# Patient Record
Sex: FEMALE | Race: WHITE | URBAN - METROPOLITAN AREA
[De-identification: names, ages, dates, MRNs, and addresses within clinical notes are randomized per-mention and may not be internally consistent; named-entity substitution may affect disease eponyms.]

---

## 2017-02-27 ENCOUNTER — OFFICE VISIT (OUTPATIENT)
Dept: LAB | Facility: SCHOOL | Age: 27
End: 2017-02-27

## 2017-02-27 VITALS
SYSTOLIC BLOOD PRESSURE: 98 MMHG | HEART RATE: 115 BPM | BODY MASS INDEX: 19.77 KG/M2 | DIASTOLIC BLOOD PRESSURE: 60 MMHG | HEIGHT: 66 IN | OXYGEN SATURATION: 100 % | WEIGHT: 123 LBS

## 2017-02-27 DIAGNOSIS — R00.0 TACHYCARDIA: ICD-10-CM

## 2017-02-27 DIAGNOSIS — R11.0 NAUSEA: ICD-10-CM

## 2017-02-27 DIAGNOSIS — R10.84 ABDOMINAL PAIN, GENERALIZED: Primary | ICD-10-CM

## 2017-02-27 PROCEDURE — 99203 OFFICE O/P NEW LOW 30 MIN: CPT | Performed by: PHYSICIAN ASSISTANT

## 2017-02-27 NOTE — PROGRESS NOTES
"  SUBJECTIVE:                                                    Nandini Washburn is a 26 year old female who presents to clinic today for the following health issues:    For the past 24 hours she has felt fatigued, generally unwell, faster heart beat. Felt hot.  Nauseous today, no vomiting, no fevers. Some constipation with harder stool today.    Hx of irregular heartbeat with cardiac ablation x 2.    No URI sx, no sore throat, no diet changes. No meds. No cough. Some abd cramping. No urinary symptoms.    Hx of LEEP procedure 1 month ago with bleeding. No hx of anemia      Rapid heartbeat      Patient complains of rapid heartbeat off and on for the past 24 hours.  Feeling nausea and very warm.             Problem list and histories reviewed & adjusted, as indicated.  Additional history: as documented    Problem list, Medication list, Allergies, and Medical/Social/Surgical histories reviewed in EPIC and updated as appropriate.    ROS:  Constitutional, HEENT, cardiovascular, pulmonary, gi and gu systems are negative, except as otherwise noted.    OBJECTIVE:                                                    BP 98/60  Pulse 115  Ht 5' 6\" (1.676 m)  Wt 123 lb (55.8 kg)  LMP 02/20/2017  SpO2 100%  BMI 19.85 kg/m2  Body mass index is 19.85 kg/(m^2).  GENERAL: healthy, alert and no distress  EYES: Eyes grossly normal to inspection, PERRL and conjunctivae and sclerae normal  HENT: ear canals and TM's normal, nose and mouth without ulcers or lesions  NECK: no adenopathy, no asymmetry, masses, or scars and thyroid normal to palpation  RESP: lungs clear to auscultation - no rales, rhonchi or wheezes  CV: tachycardic, normal S1 S2, no S3 or S4, no murmur, click or rub, no peripheral edema and peripheral pulses strong  ABDOMEN: soft,  Mild lower tenderness without rebound or guarding, no hepatosplenomegaly, no masses and bowel sounds normal       ASSESSMENT/PLAN:                                                      (R10.84) " Abdominal pain, generalized  (primary encounter diagnosis)  (R11.0) Nausea  (R00.0) Tachycardia    Could be constipation, but would check CBC to r/o anemia, EKG in urgent care given hx of cardiac ablation and her tachycardia. Possible abd xray to confirm constipation. [facility] go to  [facility] now.

## 2017-02-27 NOTE — MR AVS SNAPSHOT
"              After Visit Summary   2017    Nandini Washburn    MRN: [id number]           Patient Information     Date Of Birth          1990        Visit Information        Provider Department      2017 2:00 PM Laurie Joseph PA-C [facility]        Today's Diagnoses     Abdominal pain, generalized    -  1    Nausea        Tachycardia           Follow-ups after your visit        Who to contact     You can reach your care team any time of the day by calling [phone].  Notification of test results:  If you have an abnormal lab result, we will notify you by phone as soon as possible.         Additional Information About Your Visit        MyChart Information     [employer] lets you send messages to your doctor, view your test results, renew your prescriptions, schedule appointments and more. To sign up, go to [email or web address] . Click on \"Log in\" on the left side of the screen, which will take you to the Welcome page. Then click on \"Sign up Now\" on the right side of the page.     You will be asked to enter the access code listed below, as well as some personal information. Please follow the directions to create your username and password.     Your access code is: [email or web address]  Expires: 2017  2:52 PM     Your access code will  in 90 days. If you need help or a new code, please call your [facility] clinic or [phone].        Care EveryWhere ID     This is your Care EveryWhere ID. This could be used by other organizations to access your [facility] medical records  [id number]        Your Vitals Were     Pulse Height Last Period Pulse Oximetry BMI (Body Mass Index)       115 5' 6\" (1.676 m) 2017 100% 19.85 kg/m2        Blood Pressure from Last 3 Encounters:   17 98/60    Weight from Last 3 Encounters:   17 123 lb (55.8 kg)              Today, you had the following     No orders found for display       Primary Care Provider    " None Specified       No primary provider on file.        Thank you!     Thank you for choosing [facility]  for your care. Our goal is always to provide you with excellent care. Hearing back from our patients is one way we can continue to improve our services. Please take a few minutes to complete the written survey that you may receive in the mail after your visit with us. Thank you!             Your Updated Medication List - Protect others around you: Learn how to safely use, store and throw away your medicines at www.disposemymeds.org.          This list is accurate as of: 2/27/17  2:52 PM.  Always use your most recent med list.                   Brand Name Dispense Instructions for use    ADDERALL PO      Take 25 mg by mouth

## 2017-02-27 NOTE — NURSING NOTE
"Chief Complaint   Patient presents with     Tachycardia       Initial BP 98/60  Pulse 115  Ht 5' 6\" (1.676 m)  Wt 123 lb (55.8 kg)  LMP 02/20/2017  SpO2 100%  BMI 19.85 kg/m2 Estimated body mass index is 19.85 kg/(m^2) as calculated from the following:    Height as of this encounter: 5' 6\" (1.676 m).    Weight as of this encounter: 123 lb (55.8 kg).  Medication Reconciliation: complete     Carie Perez MA    "